# Patient Record
Sex: FEMALE | URBAN - METROPOLITAN AREA
[De-identification: names, ages, dates, MRNs, and addresses within clinical notes are randomized per-mention and may not be internally consistent; named-entity substitution may affect disease eponyms.]

---

## 2022-12-19 ENCOUNTER — OFFICE VISIT (OUTPATIENT)
Dept: URBAN - METROPOLITAN AREA CLINIC 44 | Facility: CLINIC | Age: 72
End: 2022-12-19
Payer: MEDICARE

## 2022-12-19 DIAGNOSIS — H43.813 VITREOUS DEGENERATION, BILATERAL: ICD-10-CM

## 2022-12-19 DIAGNOSIS — H25.813 COMBINED FORMS OF AGE-RELATED CATARACT, BILATERAL: Primary | ICD-10-CM

## 2022-12-19 DIAGNOSIS — H18.593 OTHER HEREDITARY CORNEAL DYSTROPHIES: ICD-10-CM

## 2022-12-19 DIAGNOSIS — H04.123 TEAR FILM INSUFFICIENCY OF BILATERAL LACRIMAL GLANDS: ICD-10-CM

## 2022-12-19 PROCEDURE — 99204 OFFICE O/P NEW MOD 45 MIN: CPT | Performed by: OPTOMETRIST

## 2022-12-19 ASSESSMENT — VISUAL ACUITY
OS: 20/30
OD: 20/20

## 2022-12-19 ASSESSMENT — INTRAOCULAR PRESSURE
OD: 12
OS: 13

## 2022-12-19 ASSESSMENT — KERATOMETRY: OD: 42.63

## 2022-12-19 NOTE — IMPRESSION/PLAN
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 Granular? Plan: Discussed findings. Observe. Rec. [place] for cataract surgery and corneal consult.

## 2022-12-19 NOTE — IMPRESSION/PLAN
Impression: Combined forms of age-related cataract, bilateral: H25.813. Visually significant/symptomatic cataracts. Patient reporting difficulty with PM vision and/or reading. BCVA OD 20/20, OS 20/30. Glare OD 20/30, OS 20/50. Plan: PLAN: Discussed findings and reviewed treatment options. Rec CE/IOL to improve vision. Patient elects to proceed with surgical treatment. Risks and benefits to be discussed by surgeon. Recommend surgery OS. Patient candidate  for the following: Std IOL, toric, multifocal, LenSx, ORA. Refractive goal to be discussed with surgeon. Patient understands they may need correction to achieve best vision. BCVA maybe limited by DED, floaters and corneal dystrophy.   Rec Dr Konrad Amador for Cat SX.

## 2023-02-28 ENCOUNTER — ADULT PHYSICAL (OUTPATIENT)
Dept: URBAN - METROPOLITAN AREA CLINIC 44 | Facility: CLINIC | Age: 73
End: 2023-02-28
Payer: MEDICARE

## 2023-02-28 DIAGNOSIS — H25.813 COMBINED FORMS OF AGE-RELATED CATARACT, BILATERAL: Primary | ICD-10-CM

## 2023-02-28 DIAGNOSIS — Z01.818 ENCOUNTER FOR OTHER PREPROCEDURAL EXAMINATION: Primary | ICD-10-CM

## 2023-02-28 PROCEDURE — 99203 OFFICE O/P NEW LOW 30 MIN: CPT | Performed by: PHYSICIAN ASSISTANT

## 2023-02-28 ASSESSMENT — PACHYMETRY
OD: 23.46
OS: 2.82
OS: 23.56
OD: 2.78

## 2023-03-06 ENCOUNTER — PRE-OPERATIVE VISIT (OUTPATIENT)
Dept: URBAN - METROPOLITAN AREA CLINIC 44 | Facility: CLINIC | Age: 73
End: 2023-03-06
Payer: MEDICARE

## 2023-03-06 DIAGNOSIS — H04.123 TEAR FILM INSUFFICIENCY OF BILATERAL LACRIMAL GLANDS: ICD-10-CM

## 2023-03-06 DIAGNOSIS — H43.813 VITREOUS DEGENERATION, BILATERAL: ICD-10-CM

## 2023-03-06 PROCEDURE — 92083 EXTENDED VISUAL FIELD XM: CPT | Performed by: OPHTHALMOLOGY

## 2023-03-06 PROCEDURE — 76514 ECHO EXAM OF EYE THICKNESS: CPT | Performed by: OPHTHALMOLOGY

## 2023-03-06 PROCEDURE — 92025 CPTRIZED CORNEAL TOPOGRAPHY: CPT | Performed by: OPHTHALMOLOGY

## 2023-03-06 PROCEDURE — 99204 OFFICE O/P NEW MOD 45 MIN: CPT | Performed by: OPHTHALMOLOGY

## 2023-03-06 ASSESSMENT — VISUAL ACUITY
OD: 20/25
OS: 20/30

## 2023-03-06 ASSESSMENT — INTRAOCULAR PRESSURE
OS: 13
OD: 13

## 2023-03-06 NOTE — IMPRESSION/PLAN
Impression: Combined forms of age-related cataract, bilateral: H25.813. Plan: Discussed cataract diagnosis with the patient. Risks and benefits of surgical treatment were discussed and understood. Patient elects surgical treatment. Specialty lens options, LenSx and ORA discussed and patient declines. Patient does not mind wearing glasses after surgery. Patient desires surgery OS only at this time. Standard IOL,  AIM= Distance,  INJECTABLE ok ([address] [facility] [place] 1st choice),  Declined AMP. Patient understands the need for glasses after surgery for BCVA. Understands that vision will be limited by cornea dz

## 2023-03-06 NOTE — IMPRESSION/PLAN
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 Plan: Discussed findings. Observe. May limit vision after surgery,. Needs specular microscopy in [facility] tech only before surgery, notify Dr. Senait Olvera when done to review.

## 2023-03-06 NOTE — IMPRESSION/PLAN
Impression: Vitreous degeneration, bilateral: H43.813. Plan: Discussed diagnosis in detail with patient. No treatment is required at this time. Discussed signs and symptoms of PVD/Floaters. Discussed risks of progression. Discussed signs and symptoms of retinal detachment. Call if Vision Acuity worsens. Will continue to observe condition and or symptoms. Pt understands this may limit vision after surgery.

## 2023-03-08 NOTE — IMPRESSION/PLAN
Impression: Combined forms of age-related cataract, bilateral: H25.813. Plan: Discussed cataract diagnosis with the patient. Risks and benefits of surgical treatment were discussed and understood. Patient elects surgical treatment. Specialty lens options, LenSx and ORA discussed and patient declines. Patient does not mind wearing glasses after surgery. Patient desires surgery OS only at this time. Standard IOL,  AIM= Distance,  INJECTABLE ok ([address] [place] 1st choice),  Declined AMP. Patient understands the need for glasses after surgery for BCVA. Understands that vision will be limited by cornea dz * BSS plus

## 2023-03-08 NOTE — IMPRESSION/PLAN
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 Plan: Discussed findings. Observe. 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 Discussed risk of corneal decompensation after cat sx, no EK at this time but may need in the future. May limit vision after surgery,.

## 2023-03-20 ENCOUNTER — SURGERY (OUTPATIENT)
Dept: URBAN - METROPOLITAN AREA SURGERY 19 | Facility: SURGERY | Age: 73
End: 2023-03-20
Payer: MEDICARE

## 2023-03-20 ENCOUNTER — OFFICE VISIT (OUTPATIENT)
Dept: URBAN - METROPOLITAN AREA CLINIC 10 | Facility: CLINIC | Age: 73
End: 2023-03-20
Payer: MEDICARE

## 2023-03-20 DIAGNOSIS — H18.593 OTHER HEREDITARY CORNEAL DYSTROPHIES, BILATERAL: Primary | ICD-10-CM

## 2023-03-20 DIAGNOSIS — I63.89 OTHER CEREBRAL INFARCTION: ICD-10-CM

## 2023-03-20 DIAGNOSIS — H25.813 COMBINED FORMS OF AGE-RELATED CATARACT, BILATERAL: ICD-10-CM

## 2023-03-20 DIAGNOSIS — H25.813 COMBINED FORMS OF AGE-RELATED CATARACT, BILATERAL: Primary | ICD-10-CM

## 2023-03-20 PROCEDURE — 99213 OFFICE O/P EST LOW 20 MIN: CPT | Performed by: OPHTHALMOLOGY

## 2023-03-20 PROCEDURE — 92286 ANT SGM IMG I&R SPECLR MIC: CPT | Performed by: OPHTHALMOLOGY

## 2023-03-20 PROCEDURE — 66984 XCAPSL CTRC RMVL W/O ECP: CPT | Performed by: OPHTHALMOLOGY

## 2023-03-21 ENCOUNTER — POST-OPERATIVE VISIT (OUTPATIENT)
Dept: URBAN - METROPOLITAN AREA CLINIC 44 | Facility: CLINIC | Age: 73
End: 2023-03-21
Payer: MEDICARE

## 2023-03-21 DIAGNOSIS — Z96.1 PRESENCE OF INTRAOCULAR LENS: Primary | ICD-10-CM

## 2023-03-21 PROCEDURE — 99024 POSTOP FOLLOW-UP VISIT: CPT | Performed by: OPTOMETRIST

## 2023-03-21 ASSESSMENT — INTRAOCULAR PRESSURE: OS: 16

## 2023-03-21 NOTE — IMPRESSION/PLAN
"  Reason for Disposition    [1] MILD-MODERATE pain AND [2] constant AND [3] present > 2 hours    Additional Information    Negative: Severe difficulty breathing (e.g., struggling for each breath, speaks in single words)    Negative: Shock suspected (e.g., cold/pale/clammy skin, too weak to stand, low BP, rapid pulse)    Negative: Difficult to awaken or acting confused  (e.g., disoriented, slurred speech)    Negative: Passed out (i.e., lost consciousness, collapsed and was not responding)    Negative: Visible sweat on face or sweat dripping down face    Negative: Sounds like a life-threatening emergency to the triager    Negative: Followed an abdomen (stomach) injury    Negative: Chest pain    Negative: [1] SEVERE pain (e.g., excruciating) AND [2] present > 1 hour    Negative: [1] Pain lasts > 10 minutes AND [2] age > 50    Negative: [1] Pain lasts > 10 minutes AND [2] age > 40 AND [3] associated chest, arm, neck, upper back or jaw pain    Negative: [1] Pain lasts > 10 minutes AND [2] age > 35 AND [3] at least one cardiac risk factor (i.e., hypertension, diabetes, obesity, smoker or strong family history of heart disease)    Negative: [1] Pain lasts > 10 minutes AND [2] history of heart disease (i.e., heart attack, bypass surgery, angina, angioplasty, CHF; not just a heart murmur)    Negative: [1] Pain lasts > 10 minutes AND [2] difficulty breathing    Negative: [1] Vomiting AND [2] contains red blood  (Exception: few streaks and only occurred once)    Negative: [1] Vomiting AND [2] contains black (\"coffee ground\") material    Negative: Blood in bowel movements  (Exception: Blood on surface of BM with constipation)    Negative: Black or tarry bowel movements  (Exception: chronic-unchanged  black-grey bowel movements AND is taking iron pills or Pepto-bismol)    Negative: [1] Pregnant > 24 weeks AND [2] hand or face swelling    Negative: Patient sounds very sick or weak to the triager    Protocols used: ABDOMINAL PAIN " Impression: S/P Cataract Extraction by phacoemulsification with IOL placement OS - 1 Day. Presence of intraocular lens  Z96.1. Plan: Reviewed post-op progress. Continue with prescribed medications as directed. RTC 3-5 weeks for final post-op visit and refraction. RTC sooner if decreased in vision or pain experienced. - [facility]    Nevin calls and says that she has upper, right, abdominal pain. Pain began yesterday, but is worse today. Pain = 4-5/10. Pt. Says that she has no way to see a Dr. Tonight.

## 2023-03-29 ENCOUNTER — POST-OPERATIVE VISIT (OUTPATIENT)
Dept: URBAN - METROPOLITAN AREA CLINIC 44 | Facility: CLINIC | Age: 73
End: 2023-03-29
Payer: MEDICARE

## 2023-03-29 DIAGNOSIS — Z96.1 PRESENCE OF INTRAOCULAR LENS: Primary | ICD-10-CM

## 2023-03-29 PROCEDURE — 99024 POSTOP FOLLOW-UP VISIT: CPT | Performed by: OPTOMETRIST

## 2023-03-29 ASSESSMENT — VISUAL ACUITY
OD: 20/20
OS: 20/25

## 2023-03-29 ASSESSMENT — INTRAOCULAR PRESSURE
OD: 15
OS: 15

## 2023-03-29 NOTE — IMPRESSION/PLAN
Impression: S/P Cataract Extraction by phacoemulsification with IOL placement OS - 9 Days. Presence of intraocular lens  Z96.1. Edema improving Plan: Reviewed post-op progress. Continue with prescribed medications as directed. RTC 3-5 weeks for final post-op visit and refraction. RTC sooner if decreased in vision or pain experienced.

## 2023-05-05 ENCOUNTER — POST-OPERATIVE VISIT (OUTPATIENT)
Dept: URBAN - METROPOLITAN AREA CLINIC 44 | Facility: CLINIC | Age: 73
End: 2023-05-05
Payer: MEDICARE

## 2023-05-05 DIAGNOSIS — Z48.810 ENCOUNTER FOR SURGICAL AFTERCARE FOLLOWING SURGERY ON A SENSE ORGAN: ICD-10-CM

## 2023-05-05 DIAGNOSIS — H52.4 PRESBYOPIA: Primary | ICD-10-CM

## 2023-05-05 PROCEDURE — 99024 POSTOP FOLLOW-UP VISIT: CPT | Performed by: OPTOMETRIST

## 2023-05-05 ASSESSMENT — INTRAOCULAR PRESSURE
OD: 18
OS: 16

## 2023-05-05 ASSESSMENT — VISUAL ACUITY
OD: 20/20
OS: 20/25

## 2023-05-05 ASSESSMENT — KERATOMETRY
OD: 66.25
OD: 42.63
OS: 43.75

## 2023-05-05 NOTE — IMPRESSION/PLAN
Impression: S/P Cataract Extraction by phacoemulsification with IOL placement OS - 46 Days. Encounter for surgical aftercare following surgery on a sense organ  Z48.810.  Plan: PLAN: Updated RX and RTC 12 months for complete

## 2023-08-07 ENCOUNTER — OFFICE VISIT (OUTPATIENT)
Dept: URBAN - METROPOLITAN AREA CLINIC 44 | Facility: CLINIC | Age: 73
End: 2023-08-07
Payer: MEDICARE

## 2023-08-07 DIAGNOSIS — Z48.810 ENCOUNTER FOR SURGICAL AFTERCARE FOLLOWING SURGERY ON A SENSE ORGAN: Primary | ICD-10-CM

## 2023-08-07 PROCEDURE — 99213 OFFICE O/P EST LOW 20 MIN: CPT | Performed by: OPHTHALMOLOGY

## 2023-08-07 ASSESSMENT — INTRAOCULAR PRESSURE
OS: 16
OD: 16